# Patient Record
Sex: FEMALE | Race: BLACK OR AFRICAN AMERICAN | Employment: OTHER | URBAN - METROPOLITAN AREA
[De-identification: names, ages, dates, MRNs, and addresses within clinical notes are randomized per-mention and may not be internally consistent; named-entity substitution may affect disease eponyms.]

---

## 2018-04-12 ENCOUNTER — HOSPITAL ENCOUNTER (OUTPATIENT)
Dept: PHYSICAL THERAPY | Age: 68
Discharge: HOME | End: 2018-04-12
Payer: MEDICARE

## 2018-04-12 PROCEDURE — 97530 THERAPEUTIC ACTIVITIES: CPT | Performed by: PHYSICAL THERAPIST

## 2018-04-12 PROCEDURE — G8979 MOBILITY GOAL STATUS: HCPCS | Performed by: PHYSICAL THERAPIST

## 2018-04-12 PROCEDURE — 97162 PT EVAL MOD COMPLEX 30 MIN: CPT | Performed by: PHYSICAL THERAPIST

## 2018-04-12 PROCEDURE — G8978 MOBILITY CURRENT STATUS: HCPCS | Performed by: PHYSICAL THERAPIST

## 2018-04-12 NOTE — PROGRESS NOTES
[facility] [address], [place], [address] [facility]  Phone: [phone]      Fax:  [phone]    Plan of Care/ Statement of Necessity for Physical Therapy Services    Patient name: Abigail Gaviria Start of Care: 2018   Referral source: Sandie Bhat NP : 1950    Medical Diagnosis: Low back pain [M54.5]   Onset Date:2017    Treatment Diagnosis: Low back pain, weakness   Prior Hospitalization: see medical history Provider#: [id number]   Medications: Verified on Patient summary List    Comorbidities: HTN, COPD, sleep apnea, BMI >30, spinal stenosis, asthma,   Prior Level of Function: Independent with no limitiations. The Plan of Care and following information is based on the information from the initial evaluation. Assessment/ key information: Patient is a 75 y/o female who presents to outpatient PT with primary complaint of low back pain with radiating pain down right LE. Patient reports that low back pain and weakness started following right TKR in 2017. Patient received therapy for LBP from 2017-2018 and was improving but exhausted benefits at [facility]. Patient continues to report pain with standing, walking, sitting for prolonged periods and weakness. MD referral of spinal stenosis and evaluation consistent with those findings. Patient demonstrates significant instability in core and LE, decreased ROM and flexibility in bilateral LE's secondary to bilateral TKR in  and , excessive anterior pelvic tilt and lumbar lordosis with hypertonicity in lumbar paraspinals.  Patient reports having progressed from ambulating with walker to cane and now to nothing but continues to have pain and weakness.      Patient will benefit from skilled PT services to modify and progress therapeutic interventions, address functional mobility deficits, address ROM deficits, address strength deficits, analyze and address soft tissue restrictions, analyze and cue movement patterns, analyze and modify body mechanics/ergonomics and assess and modify postural abnormalities to attain remaining goals. Evaluation Complexity History MEDIUM  Complexity : 1-2 comorbidities / personal factors will impact the outcome/ POC ; Examination MEDIUM Complexity : 3 Standardized tests and measures addressing body structure, function, activity limitation and / or participation in recreation  ;Presentation MEDIUM Complexity : Evolving with changing characteristics  ; Clinical Decision Making MEDIUM Complexity : FOTO score of 26-74  Overall Complexity Rating: MEDIUM    Problem List: pain affecting function, decrease ROM, decrease strength, impaired gait/ balance, decrease ADL/ functional abilitiies, decrease activity tolerance, decrease flexibility/ joint mobility and decrease transfer abilities   Treatment Plan may include any combination of the following: Therapeutic exercise, Therapeutic activities, Neuromuscular re-education, Physical agent/modality, Gait/balance training, Manual therapy, Patient education, Functional mobility training and Home safety training  Patient / Family readiness to learn indicated by: asking questions and interest  Persons(s) to be included in education: patient (P)  Barriers to Learning/Limitations: None  Patient Goal (s): To have less pain in my back and get stronger  Patient Self Reported Health Status: good  Rehabilitation Potential: good    Progress towards goals / Updated goals:  Short Term Goals: STG- To be accomplished in 3 week(s):  1. Pt will be independent with HEP to encourage prophylaxis. Eval:Initiate next session. Current: NA     Long Term Goals: LTG- To be accomplished in 6 week(s):  1. Pt will report a >50% reduction in low back pain in order to stand for increased periods with less pain.   Eval:7-10/10  Current: NA     2.  Pt will increase bilateral gross LE strength to >4/5 in order to improve overall stabilization during functional activities. Eval:Gross bilateral LE: 3+/5 (unable to tolerate MMT secondary to poor core stabilization)  Current: NA     3.  Pt will increase AROM of lumbar spine to >75% in all planes of motion with <1/10 pain to improve functional mobility. Eval:  ROM % AROM   Forward flexion 40-60 30%   Extension 20-30 40%   SB right 20-30 50%   SB left 20-30 50%   Rotation right 5-10 50%   Rotation left 5-10 50%      Current: NA     4. Pt FOTO score will increase by >15 points to show improvement in subjective low back function. Eval:40  Current: will address at visit 5    Frequency / Duration: Patient to be seen 2 times per week for 8 weeks. Patient/ Caregiver education and instruction: Diagnosis, prognosis, self care and exercises   [x]  Plan of care has been reviewed with PATRICK    G-Codes (GP)  Mobility   Current  CL= 60-79%   Goal  CK= 40-59%      The severity rating is based on clinical judgment and the FOTO score. Certification Period: 4/12/18 -6/7/18  Carissa Estes, PT 4/12/2018 7:43 PM  _____________________________________________________________________  I certify that the above Therapy Services are being furnished while the patient is under my care. I agree with the treatment plan and certify that this therapy is necessary.     Physician's Signature:____________________  Date:__________Time:______    Please sign and return to   [facility] the [address], [address] [facility]  Phone: [phone]      Fax:  [phone]

## 2018-04-12 NOTE — PROGRESS NOTES
PT DAILY TREATMENT NOTE/LUMBAR EVAL 3-16    Patient Name: Chapman Phoenix  Date:2018  : 1950  [x]  Patient  Verified  Payor: /    In time:3:15  Out time:4:00  Total Treatment Time (min): 45  Total Timed Codes (min): 45  1:1 Treatment Time ( [address] only): 39   Visit #: 1 of 12    Treatment Area: Low back pain [M54.5]  SUBJECTIVE  Pain Level (0-10 scale): 6  []constant []intermittent [x]improving []worsening []no change since onset    Any medication changes, allergies to medications, adverse drug reactions, diagnosis change, or new procedure performed?: [x] No    [] Yes (see summary sheet for update)  Subjective functional status/changes:     PLOF: Prior to knee replacement, patient had no low back pain. Limitations to PLOF: walking, standing, transfers  Mechanism of Injury: Following knee replacment  Current symptoms/Complaints: Patient is a 75 y/o female who presents to outpatient PT with primary complaint of low back pain that is worsening.   -Patient had therapy for low back from 2017-2018 ([facility])  -2017 right TKR and ever since then it increased sciatic pain down in the leg.   -Patient had a walker but progressed to cane.  -Patient reports feeling unstable.  -Pain in back is present in low back and both knees are numb.   -patient with radiating pain down from right lateral thigh down to foot. -Therapy was helping but [facility] refused to take them back.   -Patient has had 8 injections in back and knee.  -Leaving for vacation in   -Patient is going to yoga and getting a .   -Standing increases pain, sitting increases pain and causes stiffness.   -Patient sleeps on right side to reduce the pain.  -Within the last 5 years she has had 5 surgeries. Previous Treatment/Compliance: Patient was treated at [facility] for low back from 2017-2018. Patient had therapy for [facility] at [facility] and [facility].    PMHx/Surgical Hx: See scanned medical record  Work Hx:   Living Situation: Tri-level home and for 9 months confined downstairs. Pt Goals: \"Be able to bend forward at back and get up without so much pain in my back. \"  Barriers: [x]pain []financial []time []transportation []other  Motivation: Good  Substance use: []Alcohol []Tobacco []other:   FABQ Score: []low []elevate  Cognition: A & O x 4    Other:    OBJECTIVE/EXAMINATION      22 min []Eval                  []Re-Eval         23 min Therapeutic Activity:  []  See flow sheet : patient eduction, body mechanics, prognosis, goals   Rationale: increase ROM, increase strength and improve coordination  to improve the patients ability to perform daily activities with decreased pain and symptom levels                 With   [] TE   [x] TA   [] neuro   [] other: Patient Education: [x] Review HEP    [x] Progressed/Changed HEP based on:   [] positioning   [] body mechanics   [] transfers   [] heat/ice application    [] other:      Other Objective/Functional Measures: FOTO: 40    Physical Therapy Evaluation - Lumbar Spine ([facility])         General Health:  Red Flags Indicated? [] Yes    [x] No  [] Yes [] No Recent weight change (If yes, due to dieting? [] Yes  [] No)   [] Yes [] No Weakness in legs during walking  [] Yes [] No Unremitting pain at night  [] Yes [] No Abdominal pain or problems  [] Yes [] No Rectal bleeding  [] Yes [] No Feet more cold or painful in cold weather  [] Yes [] No Menstrual irregularities  [] Yes [] No Blood or pain with urination  [] Yes [] No Dysfunction of bowel or bladder  [] Yes [] No Recent illness within past 3 weeks (i.e, cold, flu)  [] Yes [] No Numbness/tingling in buttock/genitalia region    Past History/Treatments:     Diagnostic Tests: [] Lab work [x] X-rays    [] CT [] MRI     [] Other:  Results: Spinal stenosis lumbar spine.      Functional Status  Prior level of function:  Present functional limitations:  What position do you sleep in?:    OBJECTIVE  Posture:  Lateral Shift: [] R    [] L     [] +  [] -  Kyphosis: [x] Increased [] Decreased   []  WNL  Lordosis:  [x] Increased [] Decreased   [] WNL  Pelvic symmetry: [] WNL    [] Other: Excessive anterior pelvic tilt. Gait:  [] Normal     [] Abnormal:    Active Movements: [] N/A   [] Too acute   [] Other:  ROM % AROM % PROM Comments:pain, area   Forward flexion 40-60 30%     Extension 20-30 40%     SB right 20-30 50%     SB left 20-30 50%     Rotation right 5-10 50%     Rotation left 5-10 50%       -Pain with all functional movements of lumbar spine. Repeated Movements   -repeated lumbar extension- no change in radicular symptoms  -Repeated lumbar flexion: decreased pain in right LE. Neuro Screen [] WNL  Myotome/Dermatome/Reflexes:  Comments:    Dural Mobility:  SLR Sitting: [] R    [] L    [] +    [] -  @ (degrees):           Supine: [x] R    [] L    [x] +    [] -  @ (degrees):   Slump Test: [] R    [] L    [] +    [] -  @ (degrees):   Prone Knee Bend: [] R    [] L    [] +    [] -     Palpation  -Hypertonicity in lumbar parapsinals, TTP noted in L3-L5    Strength   L(0-5) R (0-5) N/T   Hip Flexion (L1,2) 3+ 3+ []   Knee Extension (L3,4) 3+ 3+ []   Ankle Dorsiflexion (L4) 3+ 3+ []   Ankle Plantarflexion (S1) 3+ 3+ []   Knee Flexion (S1,2) 3+ 3+ []   Gluteus Octaviano 3+ 3+ []   Hip Abductors 3+ 3+ []     -Poor core stabilization observed when attempting to provided MMT    Special Tests  Lumbar:  Lumb.  Compression: [] Pos  [] Neg               Lumbar Distraction:   [] Pos  [] Neg    Quadrant:  [] Pos  [] Neg   [] Flex  [] Ext    Sacroilliac:  Gaenslen's: [] R    [] L    [] +    [] -     Compression: [] +    [] -     Gapping:  [] +    [] -     Thigh Thrust: [] R    [] L    [] +    [] -     Leg Length: [] +    [] -   Position:    Crests:    ASIS:    PSIS:    Sacral Sulcus:    Mobility: Standing flex:     Sitting flex:     Supine to sit:     Prone knee bend:         Hip: Beather Slate:  [x] R [x] L    [x] +    [] -     Scour:  [] R    [] L    [] +    [] -     Piriformis: [x] R    [x] L    [x] +    [] -          Deficits: Meliton's: [] R    [] L    [] +    [] -     Sivakumar: [] R    [] L    [] +    [] -     Hamstrings 90/90:    Gastrocsoleus (to neutral): Right: Left:       Global Muscular Weakness:  Abdominals:  Quadratus Lumborum:  Paraspinals: Other:    Other tests/comments:       Pain Level (0-10 scale) post treatment: 6/10    ASSESSMENT/Changes in Function: Patient is a 75 y/o female who presents to outpatient PT with primary complaint of low back pain with radiating pain down right LE. Patient reports that low back pain and weakness started following right TKR in January 2017. Patient received therapy for LBP from Nov 2017-March 2018 and was improving but exhausted benefits at [facility]. Patient continues to report pain with standing, walking, sitting for prolonged periods and weakness. MD referral of spinal stenosis and evaluation consistent with those findings. Patient demonstrates significant instability in core and LE, decreased ROM and flexibility in bilateral LE's secondary to bilateral TKR in 2017 and 2016, excessive anterior pelvic tilt and lumbar lordosis with hypertonicity in lumbar paraspinals. Patient reports having progressed from ambulating with walker to cane and now to nothing but continues to have pain and weakness. Patient will benefit from skilled PT services to modify and progress therapeutic interventions, address functional mobility deficits, address ROM deficits, address strength deficits, analyze and address soft tissue restrictions, analyze and cue movement patterns, analyze and modify body mechanics/ergonomics and assess and modify postural abnormalities to attain remaining goals.      []  See Plan of Care  []  See progress note/recertification  []  See Discharge Summary         Progress towards goals / Updated goals:  Short Term Goals: STG- To be accomplished in 3 week(s):  1. Pt will be independent with HEP to encourage prophylaxis. Eval:Initiate next session. Current: NA    Long Term Goals: LTG- To be accomplished in 6 week(s):  1. Pt will report a >50% reduction in low back pain in order to stand for increased periods with less pain. Eval:7-10/10  Current: NA    2. Pt will increase bilateral gross LE strength to >4/5 in order to improve overall stabilization during functional activities. Eval:Gross bilateral LE: 3+/5 (unable to tolerate MMT secondary to poor core stabilization)  Current: NA    3. Pt will increase AROM of lumbar spine to >75% in all planes of motion with <1/10 pain to improve functional mobility. Eval:  ROM % AROM   Forward flexion 40-60 30%   Extension 20-30 40%   SB right 20-30 50%   SB left 20-30 50%   Rotation right 5-10 50%   Rotation left 5-10 50%     Current: NA    4. Pt FOTO score will increase by >15 points to show improvement in subjective low back function.   Eval:40  Current: will address at visit 5    PLAN  [x]  Upgrade activities as tolerated     [x]  Continue plan of care  []  Update interventions per flow sheet       []  Discharge due to:_  []  Other:_      Cinthia Tolliver, PT 4/12/2018  3:17 PM

## 2018-04-17 ENCOUNTER — HOSPITAL ENCOUNTER (OUTPATIENT)
Dept: PHYSICAL THERAPY | Age: 68
Discharge: HOME | End: 2018-04-17
Payer: MEDICARE

## 2018-04-17 PROCEDURE — 97530 THERAPEUTIC ACTIVITIES: CPT | Performed by: PHYSICAL THERAPIST

## 2018-04-17 PROCEDURE — 97110 THERAPEUTIC EXERCISES: CPT | Performed by: PHYSICAL THERAPIST

## 2018-04-17 NOTE — PROGRESS NOTES
PT DAILY TREATMENT NOTE - [employer]     Patient Name: Rena Fowler  Date:2018  : 1950  [x]  Patient  Verified  Payor: Renny Frankel / Plan: [place] & [place] / Product Type: Medicare /    In time:2:18  Out time:3:30  Total Treatment Time (min): 72  Total Timed Codes (min): 62  1:1 Treatment Time ( [address] only): 30   Visit #: 2 of 12    Treatment Area: Low back pain [M54.5]    SUBJECTIVE  Pain Level (0-10 scale): 7/10  Any medication changes, allergies to medications, adverse drug reactions, diagnosis change, or new procedure performed?: [x] No    [] Yes (see summary sheet for update)  Subjective functional status/changes:   [] No changes reported  \"I am just feeling stiff today. \"    OBJECTIVE    Modality rationale: decrease pain and increase tissue extensibility to improve the patients ability to perform daily activities with decreased pain and symptom levels     Min Type Additional Details    [] Estim:  []Unatt       []IFC  []Premod                        []Other:  []w/ice   []w/heat  Position:  Location:    [] Estim: []Att    []TENS instruct  []NMES                    []Other:  []w/US   []w/ice   []w/heat  Position:  Location:    []  Traction: [] Cervical       []Lumbar                       [] Prone          []Supine                       []Intermittent   []Continuous Lbs:  [] before manual  [] after manual    []  Ultrasound: []Continuous   [] Pulsed                           []1MHz   []3MHz W/cm2:  Location:    []  Iontophoresis with dexamethasone         Location: [] Take home patch   [] In clinic   10 []  Ice     [x]  heat  []  Ice massage  []  Laser   []  Anodyne Position:  Location:    []  Laser with stim  []  Other:  Position:  Location:    []  Vasopneumatic Device Pressure:       [] lo [] med [] hi   Temperature: [] lo [] med [] hi   [] Skin assessment post-treatment:  []intact []redness- no adverse reaction    []redness - adverse reaction:       50 min Therapeutic Exercise:  [x] See flow sheet :   Rationale: increase ROM, increase strength and improve coordination to improve the patients ability to perform daily activities with decreased pain and symptom levels      12 min Therapeutic Activity:  []  See flow sheet :   Rationale: increase ROM, increase strength and improve coordination  to improve the patients ability to perform daily activities with decreased pain and symptom levels           With   [] TE   [] TA   [] neuro   [] other: Patient Education: [x] Review HEP    [] Progressed/Changed HEP based on:   [] positioning   [] body mechanics   [] transfers   [] heat/ice application    [] other:      Other Objective/Functional Measures:   -patient required frequent resting breaks secondary to fatigue     Pain Level (0-10 scale) post treatment: 6/10    ASSESSMENT/Changes in Function: Patient reported pain through exercise program. Educated patient that with previous TKR, patient will have pain for about a year post op. Patient will continue to benefit from skilled PT services to modify and progress therapeutic interventions, address functional mobility deficits, address ROM deficits, address strength deficits, analyze and address soft tissue restrictions, analyze and cue movement patterns, analyze and modify body mechanics/ergonomics and assess and modify postural abnormalities to attain remaining goals. []  See Plan of Care  []  See progress note/recertification  []  See Discharge Summary         Progress towards goals / Updated goals:  Short Term Goals: STG- To be accomplished in 3 week(s):  1.  Pt will be independent with HEP to encourage prophylaxis. Eval:Initiate next session. Current: NA      Long Term Goals: LTG- To be accomplished in 6 week(s):  1.  Pt will report a >50% reduction in low back pain in order to stand for increased periods with less pain.   Eval:7-10/10  Current: NA      2.  Pt will increase bilateral gross LE strength to >4/5 in order to improve overall stabilization during functional activities. Eval:Gross bilateral LE: 3+/5 (unable to tolerate MMT secondary to poor core stabilization)  Current: NA      3.  Pt will increase AROM of lumbar spine to >75% in all planes of motion with <1/10 pain to improve functional mobility.    Eval:  ROM % AROM   Forward flexion 40-60 30%   Extension 20-30 40%   SB right 20-30 50%   SB left 20-30 50%   Rotation right 5-10 50%   Rotation left 5-10 50%       Current: NA      4.  Pt FOTO score will increase by >15 points to show improvement in subjective low back function.   Eval:40  Current: will address at visit 5      PLAN   [x]  Upgrade activities as tolerated     [x]  Continue plan of care  []  Update interventions per flow sheet       []  Discharge due to:_  []  Other:_      Tony Bauer PT 4/17/2018  2:32 PM    Future Appointments  Date Time Provider [place]   4/20/2018 9:30 AM Tony Bauer PT [facility]   4/24/2018 2:00 PM Juno Oquendo [facility]   4/26/2018 12:30 PM Ra Miller PT, DPT [facility]   5/1/2018 2:30 PM Juno Oquendo [facility]   5/3/2018 2:00 PM Jad Guardado PTA [facility]

## 2018-04-20 ENCOUNTER — HOSPITAL ENCOUNTER (OUTPATIENT)
Dept: PHYSICAL THERAPY | Age: 68
Discharge: HOME | End: 2018-04-20
Payer: MEDICARE

## 2018-04-20 PROCEDURE — 97110 THERAPEUTIC EXERCISES: CPT | Performed by: PHYSICAL THERAPIST

## 2018-04-20 PROCEDURE — 97530 THERAPEUTIC ACTIVITIES: CPT | Performed by: PHYSICAL THERAPIST

## 2018-04-20 NOTE — PROGRESS NOTES
PT DAILY TREATMENT NOTE - [facility]     Patient Name: Sukumar Tripp  Date:2018  : 1950  [x]  Patient  Verified  Payor: [place] MED[place]ARE / Plan: [place] MEDICARE PART A & B / Product Type: Medicare /    In time:9:40  Out time:11:00  Total Treatment Time (min): 80  Total Timed Codes (min): 70  1:1 Treatment Time ( [address] only): 30   Visit #: 3 of 12    Treatment Area: Low back pain [M54.5]    SUBJECTIVE  Pain Level (0-10 scale): 7/10  Any medication changes, allergies to medications, adverse drug reactions, diagnosis change, or new procedure performed?: [x] No    [] Yes (see summary sheet for update)  Subjective functional status/changes:   [] No changes reported  \"I felt pretty good after last session; like I got a good workout. \"    OBJECTIVE    Modality rationale: decrease pain and increase tissue extensibility to improve the patients ability to perform daily activities with decreased pain and symptom levels     Min Type Additional Details    [] Estim:  []Unatt       []IFC  []Premod                        []Other:  []w/ice   []w/heat  Position:  Location:    [] Estim: []Att    []TENS instruct  []NMES                    []Other:  []w/US   []w/ice   []w/heat  Position:  Location:    []  Traction: [] Cervical       []Lumbar                       [] Prone          []Supine                       []Intermittent   []Continuous Lbs:  [] before manual  [] after manual    []  Ultrasound: []Continuous   [] Pulsed                           []1MHz   []3MHz W/cm2:  Location:    []  Iontophoresis with dexamethasone         Location: [] Take home patch   [] In clinic   10 []  Ice     [x]  heat  []  Ice massage  []  Laser   []  Anodyne Position:  Location:    []  Laser with stim  []  Other:  Position:  Location:    []  Vasopneumatic Device Pressure:       [] lo [] med [] hi   Temperature: [] lo [] med [] hi   [] Skin assessment post-treatment:  []intact []redness- no adverse reaction    []redness - adverse reaction:       55 min Therapeutic Exercise:  [x] See flow sheet :   Rationale: increase ROM, increase strength and improve coordination to improve the patients ability to perform daily activities with decreased pain and symptom levels      15 min Therapeutic Activity:  [x]  See flow sheet :   Rationale: increase ROM, increase strength and improve coordination  to improve the patients ability to perform daily activities with decreased pain and symptom levels               With   [] TE   [] TA   [] neuro   [] other: Patient Education: [x] Review HEP    [] Progressed/Changed HEP based on:   [] positioning   [] body mechanics   [] transfers   [] heat/ice application    [] other:      Other Objective/Functional Measures:   -easily fatigues during there-ex     Pain Level (0-10 scale) post treatment: 5/10    ASSESSMENT/Changes in Function: Patient had a reduction in pain following today's session. Patient will continue to benefit from skilled PT services to modify and progress therapeutic interventions, address functional mobility deficits, address ROM deficits, address strength deficits, analyze and address soft tissue restrictions, analyze and cue movement patterns and analyze and modify body mechanics/ergonomics to attain remaining goals. []  See Plan of Care  []  See progress note/recertification  []  See Discharge Summary         Progress towards goals / Updated goals:  Short Term Goals: STG- To be accomplished in 3 week(s):  1.  Pt will be independent with HEP to encourage prophylaxis. Eval:Initiate next session. Current: NA      Long Term Goals: LTG- To be accomplished in 6 week(s):  1.  Pt will report a >50% reduction in low back pain in order to stand for increased periods with less pain. Eval:7-10/10  Current: NA      2.  Pt will increase bilateral gross LE strength to >4/5 in order to improve overall stabilization during functional activities.   Eval:Gross bilateral LE: 3+/5 (unable to tolerate MMT secondary to poor core stabilization)  Current: Tolerating AROM exercises      3.  Pt will increase AROM of lumbar spine to >75% in all planes of motion with <1/10 pain to improve functional mobility.    Eval:  ROM % AROM   Forward flexion 40-60 30%   Extension 20-30 40%   SB right 20-30 50%   SB left 20-30 50%   Rotation right 5-10 50%   Rotation left 5-10 50%       Current: NA      4.  Pt FOTO score will increase by >15 points to show improvement in subjective low back function.   Eval:40  Current: will address at visit 5      PLAN  []  Upgrade activities as tolerated     []  Continue plan of care  []  Update interventions per flow sheet       []  Discharge due to:_  []  Other:_      Katharine Casey PT 4/20/2018  10:01 AM    Future Appointments  Date Time Provider Robert Malagon   4/24/2018 3:30 PM Mady Oquendo [facility]   4/26/2018 12:30 PM Naif Summers PT, DPT [facility]   5/1/2018 2:30 PM Mady Oquendo [facility]   5/3/2018 2:00 PM Favian Lamb PTA [facility]

## 2018-04-24 ENCOUNTER — HOSPITAL ENCOUNTER (OUTPATIENT)
Dept: PHYSICAL THERAPY | Age: 68
Discharge: HOME | End: 2018-04-24
Payer: MEDICARE

## 2018-04-24 PROCEDURE — 97110 THERAPEUTIC EXERCISES: CPT

## 2018-04-24 NOTE — PROGRESS NOTES
PT DAILY TREATMENT NOTE - [facility]     Patient Name: Ethan Garcia  Date:2018  : 1950  [x]  Patient  Verified  Payor: Andry Murdock / Plan: [place] MEDICARE PART A & B / Product Type: Medicare /    In time:3:30  Out time:4:30  Total Treatment Time (min): 60  Total Timed Codes (min): 50  1:1 Treatment Time ( [address] only): 30   Visit #: 4 of 12    Treatment Area: Low back pain [M54.5]    SUBJECTIVE  Pain Level (0-10 scale): 7  Any medication changes, allergies to medications, adverse drug reactions, diagnosis change, or new procedure performed?: [x] No    [] Yes (see summary sheet for update)  Subjective functional status/changes:   [] No changes reported  \"the weather is making me hurt more today. I have a MD appointment 18. \"    OBJECTIVE    Modality rationale: decrease inflammation and decrease pain to improve the patients ability to perform daily activities with decreased pain and symptom levels   Min Type Additional Details    [] Estim:  []Unatt       []IFC  []Premod                        []Other:  []w/ice   []w/heat  Position:  Location:    [] Estim: []Att    []TENS instruct  []NMES                    []Other:  []w/US   []w/ice   []w/heat  Position:  Location:    []  Traction: [] Cervical       []Lumbar                       [] Prone          []Supine                       []Intermittent   []Continuous Lbs:  [] before manual  [] after manual    []  Ultrasound: []Continuous   [] Pulsed                           []1MHz   []3MHz W/cm2:  Location:    []  Iontophoresis with dexamethasone         Location: [] Take home patch   [] In clinic   10 []  Ice     [x]  heat  []  Ice massage  []  Laser   []  Anodyne Position: supine  Location:low back     []  Laser with stim  []  Other:  Position:  Location:    []  Vasopneumatic Device Pressure:       [] lo [] med [] hi   Temperature: [] lo [] med [] hi   [x] Skin assessment post-treatment:  [x]intact []redness- no adverse reaction    []redness - adverse reaction: 50 min Therapeutic Exercise:  [x] See flow sheet :   Rationale: increase ROM, increase strength and increase proprioception to improve the patients ability to perform daily activities with decreased pain and symptom levels    With   [] TE   [] TA   [] neuro   [] other: Patient Education: [x] Review HEP    [] Progressed/Changed HEP based on:   [] positioning   [] body mechanics   [] transfers   [] heat/ice application    [] other:      Other Objective/Functional Measures:   Increased time needed with exercises due to fatigue     Pain Level (0-10 scale) post treatment: 5    ASSESSMENT/Changes in Function: Pt continues to need extra time with exercises however able to tolerate. Decreased core strength noted with PPT with use of upper body needing cues to correct. Patient will continue to benefit from skilled PT services to modify and progress therapeutic interventions, address functional mobility deficits, address ROM deficits, address strength deficits, analyze and modify body mechanics/ergonomics, assess and modify postural abnormalities and instruct in home and community integration to attain remaining goals. []  See Plan of Care  []  See progress note/recertification  []  See Discharge Summary         Progress towards goals / Updated goals:  Short Term Goals: STG- To be accomplished in 3 week(s):  1.  Pt will be independent with HEP to encourage prophylaxis. Eval:Initiate next session. Current: compliance per pt report       Long Term Goals: LTG- To be accomplished in 6 week(s):  1.  Pt will report a >50% reduction in low back pain in order to stand for increased periods with less pain. Eval:7-10/10  Current: no change       2.  Pt will increase bilateral gross LE strength to >4/5 in order to improve overall stabilization during functional activities. Eval:Gross bilateral LE: 3+/5 (unable to tolerate MMT secondary to poor core stabilization)  Current:  Tolerating AROM exercises      3.  Pt will increase AROM of lumbar spine to >75% in all planes of motion with <1/10 pain to improve functional mobility.    Eval:  ROM % AROM   Forward flexion 40-60 30%   Extension 20-30 40%   SB right 20-30 50%   SB left 20-30 50%   Rotation right 5-10 50%   Rotation left 5-10 50%       Current: NA      4.  Pt FOTO score will increase by >15 points to show improvement in subjective low back function.   Eval:40  Current: will address at visit 5      PLAN  [x]  Upgrade activities as tolerated     [x]  Continue plan of care  []  Update interventions per flow sheet       []  Discharge due to:_  []  Other:_      Janneth Oquendo 4/24/2018  3:34 PM    Future Appointments  Date Time Provider Robert Malagon   4/26/2018 12:30 PM Nelly Torres, PT, DPT [facility]   4/30/2018 5:00 PM Veda Gramajo PTA [facility]   5/1/2018 2:30 PM Janneth LYONS [facility]

## 2018-04-26 ENCOUNTER — HOSPITAL ENCOUNTER (OUTPATIENT)
Dept: PHYSICAL THERAPY | Age: 68
Discharge: HOME | End: 2018-04-26
Payer: MEDICARE

## 2018-04-26 PROCEDURE — G8978 MOBILITY CURRENT STATUS: HCPCS

## 2018-04-26 PROCEDURE — 97110 THERAPEUTIC EXERCISES: CPT

## 2018-04-26 PROCEDURE — G8979 MOBILITY GOAL STATUS: HCPCS

## 2018-04-26 NOTE — PROGRESS NOTES
[facility] at the [address], [place], [address] [facility]  Phone: [phone]      Fax:  [phone]        Medicare Progress Report    Patient name: Moon José Start of Care: 2018   Referral source: Sunshine Bynum NP : 1950                         Medical Diagnosis: Low back pain [M54.5] Onset Date:2017                         Treatment Diagnosis: Low back pain, weakness   Prior Hospitalization: see medical history Provider#: [id number]   Medications: Verified on Patient summary List    Comorbidities: HTN, COPD, sleep apnea, BMI >30, spinal stenosis, asthma,   Prior Level of Function: Independent with no limitiations.         Visits from Start of Care: 5    Missed Visits: 0    Progress Toward Goals:   Short Term Goals: STG- To be accomplished in 3 week(s):  1.  Pt will be independent with HEP to encourage prophylaxis. Eval:Initiate next session. Current: compliance per pt report       Long Term Goals: LTG- To be accomplished in 6 week(s):  1.  Pt will report a >50% reduction in low back pain in order to stand for increased periods with less pain. Eval:7-10/10  Current: progressing gradually  8/10 at worst       2.  Pt will increase bilateral gross LE strength to >4/5 in order to improve overall stabilization during functional activities.   Eval:Gross bilateral LE: 3+/5 (unable to tolerate MMT secondary to poor core stabilization)  Current: Progressing, glrossly decreased strength especially gluts, very challenged with ext and abd  unable to tolerate MMT position, but per tolerance to exercises is gradually improving       3.  Pt will increase AROM of lumbar spine to >75% in all planes of motion with <1/10 pain to improve functional mobility.    Eval:  ROM % AROM   Forward flexion 40-60 30%   Extension 20-30 40%   SB right 20-30 50%   SB left 20-30 50%   Rotation right 5-10 50%   Rotation left 5-10 50%       Current: progressing Rot limited 25-50%      4.  Pt FOTO score will increase by >15 points to show improvement in subjective low back function. Eval:40  Current: mild improvement 41    Key functional changes: Reports continued pain into right LE from buttock into right lateral this and down to foot. Reports pain into left LE and buttock as well. Pt expresses that does not want to walk wobbly and wants to be able to bend and pick things up off the floor and bend knees. Problems/ barriers to goal attainment: none     Assessment / Recommendations:  Patient is a 75 y/o female who presents to outpatient PT with primary complaint of low back pain with radiating pain down right LE. Patient reports that low back pain and weakness started following right TKR in January 2017. Patient received therapy for LBP from Nov 2017-March 2018 and was improving but exhausted benefits at [facility]. Patient continues to report pain with standing, walking, sitting for prolonged periods and weakness. MD referral of spinal stenosis and evaluation consistent with those findings. Patient demonstrates significant instability in core and LE, decreased ROM and flexibility in bilateral LE's secondary to bilateral TKR in 2017 and 2016, excessive anterior pelvic tilt and lumbar lordosis with hypertonicity in lumbar paraspinals. Pt has been seen at this location for 5 visits including evaluation. Reports seeing improvement in 5 visits with pain, and mobility. Would benefit from continued skilled PT to address strength, ROM, core stabilization and mobility.      Problem List: pain affecting function, decrease ROM, decrease strength, impaired gait/ balance, decrease ADL/ functional abilitiies, decrease activity tolerance and decrease flexibility/ joint mobility   Treatment Plan: Therapeutic exercise, Therapeutic activities, Neuromuscular re-education, Physical agent/modality, Gait/balance training, Manual therapy and Patient education    Patient Goal (s) has been updated and includes: \"Not to walk so wobbly and to bend better. \"     Updated Goals to be accomplished in 4 weeks:  Same as above    Frequency / Duration: Patient to be seen 2 times per week for 4 weeks:    G-Codes (GP)  Mobility  [id number] Current  CL= 60-79%   Goal  CK= 40-59%      The severity rating is based on clinical judgement and the FOTO score.       Rene Walker, PT, DPT 4/26/2018 2:25 PM

## 2018-04-26 NOTE — PROGRESS NOTES
PT DAILY TREATMENT NOTE - [facility]     Patient Name: Roderick Natarajan  Date:2018  : 1950  [x]  Patient  Verified  Payor: [place] MEDICARE / Plan: [place] MEDICARE PART A & B / Product Type: Medicare /    In time:12:30 pm  Out time:2:15 pm   Total Treatment Time (min): 105  Total Timed Codes (min): 90  1:1 Treatment Time ( [address] only): 30   Visit #: 5 of 12    Treatment Area: Low back pain [M54.5]    SUBJECTIVE  Pain Level (0-10 scale): 5  Any medication changes, allergies to medications, adverse drug reactions, diagnosis change, or new procedure performed?: [x] No    [] Yes (see summary sheet for update)  Subjective functional status/changes:   [] No changes reported  \"I am doing ok. I see the doctor on Friday. They moved me up. \"    OBJECTIVE    Modality rationale: decrease pain and increase tissue extensibility to improve the patients ability to perform daily activities with decreased pain and symptom levels     Min Type Additional Details    [] Estim:  []Unatt       []IFC  []Premod                        []Other:  []w/ice   []w/heat  Position:  Location:    [] Estim: []Att    []TENS instruct  []NMES                    []Other:  []w/US   []w/ice   []w/heat  Position:  Location:    []  Traction: [] Cervical       []Lumbar                       [] Prone          []Supine                       []Intermittent   []Continuous Lbs:  [] before manual  [] after manual    []  Ultrasound: []Continuous   [] Pulsed                           []1MHz   []3MHz W/cm2:  Location:    []  Iontophoresis with dexamethasone         Location: [] Take home patch   [] In clinic   15 []  Ice     [x]  heat  []  Ice massage  []  Laser   []  Anodyne Position: supine with LE elevated  Location:L-spine    []  Laser with stim  []  Other:  Position:  Location:    []  Vasopneumatic Device Pressure:       [] lo [] med [] hi   Temperature: [] lo [] med [] hi   [x] Skin assessment post-treatment:  [x]intact []redness- no adverse reaction    []redness - adverse reaction:     90 min Therapeutic Exercise:  [x] See flow sheet :   Rationale: increase ROM, increase strength, improve coordination and increase proprioception to improve the patients ability to perform daily activities with decreased pain and symptom levels          With   [] TE   [] TA   [] neuro   [] other: Patient Education: [x] Review HEP    [] Progressed/Changed HEP based on:   [] positioning   [] body mechanics   [] transfers   [] heat/ice application    [] other:      Other Objective/Functional Measures:   FOTO 41  See goals for objective measure  significant glut weakness     Pain Level (0-10 scale) post treatment: 3-4    ASSESSMENT/Changes in Function:   Reports continued pain into right LE from buttock into right lateral this and down to foot. Reports pain into left LE and buttock as well. Pt expresses that does not want to walk wobbly and wants to be able to bend and pick things up off the floor and bend knees. Patient is a 75 y/o female who presents to outpatient PT with primary complaint of low back pain with radiating pain down right LE. Patient reports that low back pain and weakness started following right TKR in January 2017. Patient received therapy for LBP from Nov 2017-March 2018 and was improving but exhausted benefits at [address] Physical therapy. Patient continues to report pain with standing, walking, sitting for prolonged periods and weakness. MD referral of spinal stenosis and evaluation consistent with those findings. Patient demonstrates significant instability in core and LE, decreased ROM and flexibility in bilateral LE's secondary to bilateral TKR in 2017 and 2016, excessive anterior pelvic tilt and lumbar lordosis with hypertonicity in lumbar paraspinals. Pt has been seen at this location for 5 visits including evaluation. Reports seeing improvement in 5 visits with pain, and mobility.  Would benefit from continued skilled PT to address strength, ROM, core stabilization and mobility. Patient will continue to benefit from skilled PT services to modify and progress therapeutic interventions, address functional mobility deficits, address ROM deficits, address strength deficits, analyze and address soft tissue restrictions, analyze and cue movement patterns, analyze and modify body mechanics/ergonomics, assess and modify postural abnormalities and instruct in home and community integration to attain remaining goals. []  See Plan of Care  []  See progress note/recertification  []  See Discharge Summary         Progress towards goals / Updated goals:  Short Term Goals: STG- To be accomplished in 3 week(s):  1.  Pt will be independent with HEP to encourage prophylaxis. Eval:Initiate next session. Current: compliance per pt report       Long Term Goals: LTG- To be accomplished in 6 week(s):  1.  Pt will report a >50% reduction in low back pain in order to stand for increased periods with less pain. Eval:7-10/10  Current: progressing gradually  8/10 at worst       2.  Pt will increase bilateral gross LE strength to >4/5 in order to improve overall stabilization during functional activities. Eval:Gross bilateral LE: 3+/5 (unable to tolerate MMT secondary to poor core stabilization)  Current: Progressing, glrossly decreased strength especially gluts, very challenged with ext and abd  unable to tolerate MMT position, but per tolerance to exercises is gradually improving       3.  Pt will increase AROM of lumbar spine to >75% in all planes of motion with <1/10 pain to improve functional mobility.    Eval:  ROM % AROM   Forward flexion 40-60 30%   Extension 20-30 40%   SB right 20-30 50%   SB left 20-30 50%   Rotation right 5-10 50%   Rotation left 5-10 50%       Current: progressing Rot limited 25-50%      4.  Pt FOTO score will increase by >15 points to show improvement in subjective low back function.   Eval:40  Current: mild improvement 41      PLAN  [x]  Upgrade activities as tolerated     []  Continue plan of care  []  Update interventions per flow sheet       []  Discharge due to:_  []  Other:_      Conner Hong, PT, DPT 4/26/2018  12:42 PM    Future Appointments  Date Time Provider [place] Malagon   4/30/2018 5:00 PM Moise GoveaCHINO [facility]   5/1/2018 2:30 PM Esvin BARNESCHINO [facility]

## 2018-04-30 ENCOUNTER — APPOINTMENT (OUTPATIENT)
Dept: PHYSICAL THERAPY | Age: 68
End: 2018-04-30
Payer: MEDICARE

## 2018-05-01 ENCOUNTER — HOSPITAL ENCOUNTER (OUTPATIENT)
Dept: PHYSICAL THERAPY | Age: 68
Discharge: HOME | End: 2018-05-01
Payer: MEDICARE

## 2018-05-01 PROCEDURE — 97110 THERAPEUTIC EXERCISES: CPT

## 2018-05-01 NOTE — PROGRESS NOTES
PT DAILY TREATMENT NOTE - [facility]     Patient Name: Chey Swanson  Date:2018  : 1950  [x]  Patient  Verified  Payor: [place] MEDICARE / Plan: [place] MEDICARE PART A & B / Product Type: Medicare /    In time:2:30  Out time:3:26  Total Treatment Time (min): 56  Total Timed Codes (min): 56  1:1 Treatment Time ( [address] only): 35   Visit #: 6 of 12    Treatment Area: Low back pain [M54.5]    SUBJECTIVE  Pain Level (0-10 scale): 5  Any medication changes, allergies to medications, adverse drug reactions, diagnosis change, or new procedure performed?: [x] No    [] Yes (see summary sheet for update)  Subjective functional status/changes:   [] No changes reported  \"I feel like I getting better. I just surgery on my big toe from an ingrown toe nail. \"    OBJECTIVE      56 min Therapeutic Exercise:  [x] See flow sheet :   Rationale: increase ROM and increase strength to improve the patients ability to perform daily activities with decreased pain and symptom levels          With   [] TE   [] TA   [] neuro   [] other: Patient Education: [x] Review HEP    [] Progressed/Changed HEP based on:   [] positioning   [] body mechanics   [] transfers   [] heat/ice application    [] other:      Other Objective/Functional Measures:   Improved form with PPT  Increased LE fatigue today     Pain Level (0-10 scale) post treatment: 3-4    ASSESSMENT/Changes in Function: Pt with increased LE fatigue today suggesting decreased low back use. Very challenged with 3 way hip and step ups still needing frequent breaks. Session cut a little short today due to massage immediately following therapy.      Patient will continue to benefit from skilled PT services to modify and progress therapeutic interventions, address functional mobility deficits, address ROM deficits, address strength deficits, analyze and modify body mechanics/ergonomics, assess and modify postural abnormalities and instruct in home and community integration to attain remaining goals.     []  See Plan of Care  []  See progress note/recertification  []  See Discharge Summary         Progress towards goals / Updated goals:    Long Term Goals: LTG- To be accomplished in 6 week(s):  1.  Pt will report a >50% reduction in low back pain in order to stand for increased periods with less pain. Eval:7-10/10  Last PN: progressing gradually  8/10 at worst  Current: NT       2.  Pt will increase bilateral gross LE strength to >4/5 in order to improve overall stabilization during functional activities. Eval:Gross bilateral LE: 3+/5 (unable to tolerate MMT secondary to poor core stabilization)  Last PN: Progressing, glrossly decreased strength especially gluts, very challenged with ext and abd  unable to tolerate MMT position, but per tolerance to exercises is gradually improving   Current: very challenged with 3 way hip and step ups still needing frequent rest breaks - progressing       3.  Pt will increase AROM of lumbar spine to >75% in all planes of motion with <1/10 pain to improve functional mobility.    Eval:  ROM % AROM   Forward flexion 40-60 30%   Extension 20-30 40%   SB right 20-30 50%   SB left 20-30 50%   Rotation right 5-10 50%   Rotation left 5-10 50%       Current: progressing Rot limited 25-50%      4.  Pt FOTO score will increase by >15 points to show improvement in subjective low back function.   Eval:40  Current: mild improvement 41      PLAN  [x]  Upgrade activities as tolerated     [x]  Continue plan of care  []  Update interventions per flow sheet       []  Discharge due to:_  []  Other:_      [place] 5/1/2018  2:35 PM    Future Appointments  Date Time Provider Robert Malagon   5/2/2018 5:30 PM [place] MIHPTBW THE [facility]

## 2018-05-02 ENCOUNTER — HOSPITAL ENCOUNTER (OUTPATIENT)
Dept: PHYSICAL THERAPY | Age: 68
Discharge: HOME | End: 2018-05-02
Payer: MEDICARE

## 2018-05-02 PROCEDURE — 97110 THERAPEUTIC EXERCISES: CPT

## 2018-05-02 NOTE — PROGRESS NOTES
PT DAILY TREATMENT NOTE - [facility]     Patient Name: Sukumar Tripp  Date:2018  : 1950  [x]  Patient  Verified  Payor: [place] MEDICARE / Plan: [place] MEDICARE PART A & B / Product Type: Medicare /    In time:5:30  Out time:6:30  Total Treatment Time (min): 60  Total Timed Codes (min): 50  1:1 Treatment Time ( [address] only): 35   Visit #: 7 of 12    Treatment Area: Low back pain [M54.5]    SUBJECTIVE  Pain Level (0-10 scale): 5  Any medication changes, allergies to medications, adverse drug reactions, diagnosis change, or new procedure performed?: [x] No    [] Yes (see summary sheet for update)  Subjective functional status/changes:   [] No changes reported  \"My back felt a lot better after the massage yesterday. \"    OBJECTIVE    Modality rationale: decrease inflammation and decrease pain to improve the patients ability to perform daily activities with decreased pain and symptom levels   Min Type Additional Details    [] Estim:  []Unatt       []IFC  []Premod                        []Other:  []w/ice   []w/heat  Position:  Location:    [] Estim: []Att    []TENS instruct  []NMES                    []Other:  []w/US   []w/ice   []w/heat  Position:  Location:    []  Traction: [] Cervical       []Lumbar                       [] Prone          []Supine                       []Intermittent   []Continuous Lbs:  [] before manual  [] after manual    []  Ultrasound: []Continuous   [] Pulsed                           []1MHz   []3MHz W/cm2:  Location:    []  Iontophoresis with dexamethasone         Location: [] Take home patch   [] In clinic   10 [x]  Ice     []  heat  []  Ice massage  []  Laser   []  Anodyne Position:supine  Location: low back    []  Laser with stim  []  Other:  Position:  Location:    []  Vasopneumatic Device Pressure:       [] lo [] med [] hi   Temperature: [] lo [] med [] hi   [x] Skin assessment post-treatment:  [x]intact []redness- no adverse reaction    []redness - adverse reaction:         50 min Therapeutic Exercise:  [x] See flow sheet :   Rationale: increase ROM, increase strength and improve coordination to improve the patients ability to perform daily activities with decreased pain and symptom levels           With   [] TE   [] TA   [] neuro   [] other: Patient Education: [x] Review HEP    [] Progressed/Changed HEP based on:   [] positioning   [] body mechanics   [] transfers   [] heat/ice application    [] other:      Other Objective/Functional Measures:   Decreased rest breaks today with step ups       Pain Level (0-10 scale) post treatment: 2    ASSESSMENT/Changes in Function: Pt with decreased pain post session today with improved tolerance to exercises as well. Challenged with wall ball squats. Patient will continue to benefit from skilled PT services to modify and progress therapeutic interventions, address functional mobility deficits, address ROM deficits, address strength deficits, analyze and modify body mechanics/ergonomics, assess and modify postural abnormalities and instruct in home and community integration to attain remaining goals. []  See Plan of Care  []  See progress note/recertification  []  See Discharge Summary         Progress towards goals / Updated goals:  Long Term Goals: LTG- To be accomplished in 6 week(s):  1.  Pt will report a >50% reduction in low back pain in order to stand for increased periods with less pain. Eval:7-10/10  Last PN: progressing gradually  8/10 at worst  Current: 2/10 pain at end of session today - progressing       2.  Pt will increase bilateral gross LE strength to >4/5 in order to improve overall stabilization during functional activities.   Eval:Gross bilateral LE: 3+/5 (unable to tolerate MMT secondary to poor core stabilization)  Last PN: Progressing, glrossly decreased strength especially gluts, very challenged with ext and abd  unable to tolerate MMT position, but per tolerance to exercises is gradually improving   Current: very challenged with 3 way hip and step ups still needing frequent rest breaks - progressing       3.  Pt will increase AROM of lumbar spine to >75% in all planes of motion with <1/10 pain to improve functional mobility.    Eval:  ROM % AROM   Forward flexion 40-60 30%   Extension 20-30 40%   SB right 20-30 50%   SB left 20-30 50%   Rotation right 5-10 50%   Rotation left 5-10 50%       Last PN: progressing Rot limited 25-50%  Current: NT      4.  Pt FOTO score will increase by >15 points to show improvement in subjective low back function.   Eval:40  Last PN: mild improvement 41  Current: NT      PLAN  [x]  Upgrade activities as tolerated     [x]  Continue plan of care  []  Update interventions per flow sheet       []  Discharge due to:_  []  Other:_      Tayler Shaw 5/2/2018  5:26 PM    Future Appointments  Date Time Provider Robert Malagon   5/2/2018 5:30 PM [place] Shaw [facility]PT[facility] THE [facility]   5/8/2018 1:30 PM Bemargaret Alex Remesic [facility]PT[facility] THE [facility]   5/10/2018 2:30 PM Jannell Dancer, PTA [facility] THE [facility]   5/15/2018 2:00 PM Madelaine Harman PT [facility] THE [facility]   5/17/2018 1:00 PM Madelaine Harman PT [facility]   5/22/2018 1:30 PM Beola Alex Remesic [facility] THE [facility]   5/24/2018 2:00 PM Jannell Dancer, PTA [facility] THE [facility]

## 2018-05-03 ENCOUNTER — APPOINTMENT (OUTPATIENT)
Dept: PHYSICAL THERAPY | Age: 68
End: 2018-05-03
Payer: MEDICARE

## 2018-05-08 ENCOUNTER — HOSPITAL ENCOUNTER (OUTPATIENT)
Dept: PHYSICAL THERAPY | Age: 68
Discharge: HOME | End: 2018-05-08
Payer: MEDICARE

## 2018-05-08 PROCEDURE — 97110 THERAPEUTIC EXERCISES: CPT

## 2018-05-08 PROCEDURE — G8979 MOBILITY GOAL STATUS: HCPCS

## 2018-05-08 PROCEDURE — G8978 MOBILITY CURRENT STATUS: HCPCS

## 2018-05-08 NOTE — PROGRESS NOTES
PT DAILY TREATMENT NOTE - [facility]     Patient Name: Erica Mejía  Date:2018  : 1950  [x]  Patient  Verified  Payor: [place] MEDICARE / Plan: [place] MEDICARE PART A & B / Product Type: Medicare /    In time:1:30  Out time:2:43  Total Treatment Time (min): 73  Total Timed Codes (min): 63  1:1 Treatment Time ( [address] only): 35   Visit #: 8 of 12    Treatment Area: Low back pain [M54.5]    SUBJECTIVE  Pain Level (0-10 scale): 7  Any medication changes, allergies to medications, adverse drug reactions, diagnosis change, or new procedure performed?: [x] No    [] Yes (see summary sheet for update)  Subjective functional status/changes:   [] No changes reported  \"My back is getting better just stiff. \"    OBJECTIVE    Modality rationale: decrease pain and increase tissue extensibility to improve the patients ability to perform daily activities with decreased pain and symptom levels   Min Type Additional Details    [] Estim:  []Unatt       []IFC  []Premod                        []Other:  []w/ice   []w/heat  Position:  Location:    [] Estim: []Att    []TENS instruct  []NMES                    []Other:  []w/US   []w/ice   []w/heat  Position:  Location:    []  Traction: [] Cervical       []Lumbar                       [] Prone          []Supine                       []Intermittent   []Continuous Lbs:  [] before manual  [] after manual    []  Ultrasound: []Continuous   [] Pulsed                           []1MHz   []3MHz W/cm2:  Location:    []  Iontophoresis with dexamethasone         Location: [] Take home patch   [] In clinic   10 []  Ice     [x]  heat  []  Ice massage  []  Laser   []  Anodyne Position: supine  Location: low back     []  Laser with stim  []  Other:  Position:  Location:    []  Vasopneumatic Device Pressure:       [] lo [] med [] hi   Temperature: [] lo [] med [] hi   [x] Skin assessment post-treatment:  [x]intact []redness- no adverse reaction    []redness - adverse reaction:       63 min Therapeutic Exercise:  [x] See flow sheet :   Rationale: increase ROM, increase strength, improve coordination and increase proprioception to improve the patients ability to perform daily activities with decreased pain and symptom levels          With   [] TE   [] TA   [] neuro   [] other: Patient Education: [x] Review HEP    [] Progressed/Changed HEP based on:   [] positioning   [] body mechanics   [] transfers   [] heat/ice application    [] other:      Other Objective/Functional Measures:   See updated goals     Pain Level (0-10 scale) post treatment: 5    ASSESSMENT/Changes in Function: Pt presented to therapy with c/c low back pain radiating down right LE that started after TKR in January 2017. Patient received therapy for LBP from Nov 2017-March 2018 and was improving but exhausted benefits at [facility] Physical therapy. Pt reports overall back pain is decreasing with 60% improvement since starting therapy. Reports continued stiffness first thing in the morning however exercises help. ROM and exercise tolerance is improving as well with most difficulty with prolonged standing and sitting. Pt would benefit from continued skilled PT services for 3 weeks the DC to complete HEP at home to manage symptoms. Patient will continue to benefit from skilled PT services to modify and progress therapeutic interventions, address functional mobility deficits, address ROM deficits, address strength deficits, analyze and modify body mechanics/ergonomics, assess and modify postural abnormalities and instruct in home and community integration to attain remaining goals. []  See Plan of Care  []  See progress note/recertification  []  See Discharge Summary         Progress towards goals / Updated goals:  Long Term Goals: LTG- To be accomplished in 6 week(s):  1.  Pt will report a >50% reduction in low back pain in order to stand for increased periods with less pain.   Eval:7-10/10  Last PN: progressing gradually  8/10 at worst  Current: 60% reduction in symptoms - goal Met      2.  Pt will increase bilateral gross LE strength to >4/5 in order to improve overall stabilization during functional activities. Eval:Gross bilateral LE: 3+/5 (unable to tolerate MMT secondary to poor core stabilization)  Last PN: Progressing, glrossly decreased strength especially gluts, very challenged with ext and abd  unable to tolerate MMT position, but per tolerance to exercises is gradually improving   Current: 3+/5 all planes MMT however able to tolerate all exercises - progressing       3.  Pt will increase AROM of lumbar spine to >75% in all planes of motion with <1/10 pain to improve functional mobility.    Eval:  ROM % AROM   Forward flexion 40-60 30%   Extension 20-30 40%   SB right 20-30 50%   SB left 20-30 50%   Rotation right 5-10 50%   Rotation left 5-10 50%       Last PN: progressing Rot limited 25-50%    Current: progressing   ROM % AROM   Forward flexion 40-60 Mid shin 75%   Extension 20-30 50%   SB right 20-30 100%   SB left 20-30 100%   Rotation right 5-10 50%   Rotation left 5-10 50%         4.  Pt FOTO score will increase by >15 points to show improvement in subjective low back function. Eval:40  Last PN: mild improvement 41  Current:  Will reassess NV         PLAN  [x]  Upgrade activities as tolerated     [x]  Continue plan of care  []  Update interventions per flow sheet       []  Discharge due to:_  []  Other:_      Tayler Shaw 5/8/2018  1:37 PM    Future Appointments  Date Time Provider Robert Malagon   5/10/2018 2:30 PM Jannell Dancer, PTA [facility] THE [facility]   5/15/2018 2:00 PM MIKEY JavierHPCHINO THE [facility]   5/17/2018 1:00 PM MIKEY Javier THE [facility]   5/22/2018 1:30 PM Delta LYONS THE [facility]   5/24/2018 2:00 PM Jannell Dancer, PTA [facility] THE [facility]

## 2018-05-08 NOTE — PROGRESS NOTES
[facility] at the [address], [place], [address] [facility]  Phone: [phone]      Fax:  [phone]        Medicare Progress Report    Patient name: New Russo Start of Care: 18   Referral source: Merly Smith NP : 1950   Medical/Treatment Diagnosis: Low back pain [M54.5] Onset Date:2017     Prior Hospitalization: see medical history Provider#: [id number]   Medications: Verified on Patient Summary List    Comorbidities: HTN, COPD, sleep apnea, BMI >30, spinal stenosis, asthma,  Prior Level of Function: independent with no limitations  Visits from Start of Care: 8    Missed Visits: 1    Reporting Period: 18 to     Subjective Reports: \"My back is getting better just stiff. \"    [address]- To be accomplished in 6 week(s):  1.  Pt will report a >50% reduction in low back pain in order to stand for increased periods with less pain. Eval:7-10/10  Last PN: progressing gradually  8/10 at worst  Current: 60% reduction in symptoms - goal Met      2.  Pt will increase bilateral gross LE strength to >4/5 in order to improve overall stabilization during functional activities.   Eval:Gross bilateral LE: 3+/5 (unable to tolerate MMT secondary to poor core stabilization)  Last PN: Progressing, glrossly decreased strength especially gluts, very challenged with ext and abd  unable to tolerate MMT position, but per tolerance to exercises is gradually improving   Current: 3+/5 all planes MMT however able to tolerate all exercises - progressing       3.  Pt will increase AROM of lumbar spine to >75% in all planes of motion with <1/10 pain to improve functional mobility.    Eval:  ROM % AROM   Forward flexion 40-60 30%   Extension 20-30 40%   SB right 20-30 50%   SB left 20-30 50%   Rotation right 5-10 50%   Rotation left 5-10 50%       Last PN: progressing Rot limited 25-50%     Current: progressing   ROM % AROM   Forward flexion 40-60 Mid shin 75%   Extension 20-30 50%   SB right 20-30 100%   SB left 20-30 100%   Rotation right 5-10 50%   Rotation left 5-10 50%          4.  Pt FOTO score will increase by >15 points to show improvement in subjective low back function. Eval:40  Last PN: mild improvement 41  Current: Will reassess NV           Key functional changes: improved endurance, exercise tolerance, lumbar ROM      Problems/ barriers to goal attainment: pain, decreased overall strength     Assessment / Recommendations: Pt presented to therapy with c/c low back pain radiating down right LE that started after TKR in January 2017. Patient received therapy for LBP from Nov 2017-March 2018 and was improving but exhausted benefits at [facility]. Pt reports overall back pain is decreasing with 60% improvement since starting therapy. Reports continued stiffness first thing in the morning however exercises help. ROM and exercise tolerance is improving as well with most difficulty with prolonged standing and sitting. Pt would benefit from continued skilled PT services for 3 weeks the DC to complete HEP at home to manage symptoms.        Problem List: pain affecting function, decrease ROM, decrease strength, impaired gait/ balance, decrease ADL/ functional abilitiies, decrease activity tolerance, decrease flexibility/ joint mobility and decrease transfer abilities   Treatment Plan: Therapeutic exercise, Therapeutic activities, Neuromuscular re-education, Physical agent/modality, Patient education, Self Care training and Functional mobility training    Patient Goal (s) has been updated and includes: \"Get rid of the stiffness. \"     Updated Goals to be accomplished in 3 weeks:  See unmet above    Frequency / Duration: Patient to be seen 2 times per week for 3 weeks:    G-Codes (GP)  Mobility  [id number] Current  CL= 60-79%   Goal  CK= 40-59%      The severity rating is based on clinical judgement and the FOTO score.       Celine 5/8/2018 1:57 PM

## 2018-05-10 ENCOUNTER — HOSPITAL ENCOUNTER (OUTPATIENT)
Dept: PHYSICAL THERAPY | Age: 68
End: 2018-05-10
Payer: MEDICARE

## 2018-05-15 ENCOUNTER — HOSPITAL ENCOUNTER (OUTPATIENT)
Dept: PHYSICAL THERAPY | Age: 68
Discharge: HOME | End: 2018-05-15
Payer: MEDICARE

## 2018-05-15 PROCEDURE — 97110 THERAPEUTIC EXERCISES: CPT | Performed by: PHYSICAL THERAPIST

## 2018-05-15 PROCEDURE — 97530 THERAPEUTIC ACTIVITIES: CPT | Performed by: PHYSICAL THERAPIST

## 2018-05-15 NOTE — PROGRESS NOTES
PT DAILY TREATMENT NOTE - [facility]     Patient Name: Leila Dee  Date:5/15/2018  : 1950  [x]  Patient  Verified  Payor: [place] MED[place]ARE / Plan: [place] MEDICARE PART A & B / Product Type: Medicare /    In time:2:10  Out time:3:13  Total Treatment Time (min): 63  Total Timed Codes (min): 53  1:1 Treatment Time ( [address] only): 40   Visit #: 9 of 14    Treatment Area: Low back pain [M54.5]    SUBJECTIVE  Pain Level (0-10 scale): 5/10  Any medication changes, allergies to medications, adverse drug reactions, diagnosis change, or new procedure performed?: [x] No    [] Yes (see summary sheet for update)  Subjective functional status/changes:   [] No changes reported  \"I am doing fine today. \"    OBJECTIVE    Modality rationale: decrease pain and increase tissue extensibility to improve the patients ability to perform daily activities with decreased pain and symptom levels     Min Type Additional Details    [] Estim:  []Unatt       []IFC  []Premod                        []Other:  []w/ice   []w/heat  Position:  Location:    [] Estim: []Att    []TENS instruct  []NMES                    []Other:  []w/US   []w/ice   []w/heat  Position:  Location:    []  Traction: [] Cervical       []Lumbar                       [] Prone          []Supine                       []Intermittent   []Continuous Lbs:  [] before manual  [] after manual    []  Ultrasound: []Continuous   [] Pulsed                           []1MHz   []3MHz W/cm2:  Location:    []  Iontophoresis with dexamethasone         Location: [] Take home patch   [] In clinic   10 []  Ice     [x]  heat  []  Ice massage  []  Laser   []  Anodyne Position:  Location:    []  Laser with stim  []  Other:  Position:  Location:    []  Vasopneumatic Device Pressure:       [] lo [] med [] hi   Temperature: [] lo [] med [] hi   [] Skin assessment post-treatment:  []intact []redness- no adverse reaction    []redness - adverse reaction:       38 min Therapeutic Exercise:  [x] See flow sheet : Rationale: increase ROM, increase strength and improve coordination to improve the patients ability to perform daily activities with decreased pain and symptom levels      15 min Therapeutic Activity:  [x]  See flow sheet :   Rationale: increase ROM, increase strength and improve coordination  to improve the patients ability to perform daily activities with decreased pain and symptom levels                 With   [] TE   [] TA   [] neuro   [] other: Patient Education: [x] Review HEP    [] Progressed/Changed HEP based on:   [] positioning   [] body mechanics   [] transfers   [] heat/ice application    [] other:      Other Objective/Functional Measures:   -patient demonstrate fatigue and instability in bilateral LE's during wall ball squats. Pain Level (0-10 scale) post treatment: 5/10    ASSESSMENT/Changes in Function: Unable to clear hips from table during bridges due to decreased hip strength and stability. Patient will continue to benefit from skilled PT services to modify and progress therapeutic interventions, address functional mobility deficits, address ROM deficits, address strength deficits, analyze and address soft tissue restrictions, analyze and cue movement patterns, analyze and modify body mechanics/ergonomics and assess and modify postural abnormalities to attain remaining goals. []  See Plan of Care  []  See progress note/recertification  []  See Discharge Summary         Long Term Goals: LTG- To be accomplished in 6 week(s):  1.  Pt will report a >50% reduction in low back pain in order to stand for increased periods with less pain. Eval:7-10/10  Last PN: progressing gradually  8/10 at worst  Current: 60% reduction in symptoms - goal Met      2.  Pt will increase bilateral gross LE strength to >4/5 in order to improve overall stabilization during functional activities.   Eval:Gross bilateral LE: 3+/5 (unable to tolerate MMT secondary to poor core stabilization)  Last PN: Progressing, glrossly decreased strength especially gluts, very challenged with ext and abd  unable to tolerate MMT position, but per tolerance to exercises is gradually improving   Current: 3+/5 all planes MMT however able to tolerate all exercises - progressing       3.  Pt will increase AROM of lumbar spine to >75% in all planes of motion with <1/10 pain to improve functional mobility.    Eval:  ROM % AROM   Forward flexion 40-60 30%   Extension 20-30 40%   SB right 20-30 50%   SB left 20-30 50%   Rotation right 5-10 50%   Rotation left 5-10 50%       Last PN: progressing Rot limited 25-50%      Current: progressing   ROM % AROM   Forward flexion 40-60 Mid shin 75%   Extension 20-30 50%   SB right 20-30 100%   SB left 20-30 100%   Rotation right 5-10 50%   Rotation left 5-10 50%           4.  Pt FOTO score will increase by >15 points to show improvement in subjective low back function. Eval:40  Last PN: mild improvement 41  Current:  Will reassess NV      PLAN  [x]  Upgrade activities as tolerated     [x]  Continue plan of care  []  Update interventions per flow sheet       []  Discharge due to:_  []  Other:_      Camilla Kay, PT 5/15/2018  2:20 PM    Future Appointments  Date Time Provider Robert Malagon   5/17/2018 1:00 PM MIKEY ArzateCHINO [facility]   5/22/2018 1:30 PM Neal LYONS [facility]   5/24/2018 2:00 PM PATRICK ZimmerHPCHINO [facility]

## 2018-05-17 ENCOUNTER — HOSPITAL ENCOUNTER (OUTPATIENT)
Dept: PHYSICAL THERAPY | Age: 68
Discharge: HOME | End: 2018-05-17
Payer: MEDICARE

## 2018-05-17 PROCEDURE — 97530 THERAPEUTIC ACTIVITIES: CPT | Performed by: PHYSICAL THERAPIST

## 2018-05-17 PROCEDURE — 97110 THERAPEUTIC EXERCISES: CPT | Performed by: PHYSICAL THERAPIST

## 2018-05-17 NOTE — PROGRESS NOTES
PT DAILY TREATMENT NOTE - [facility]     Patient Name: Honey Villarreal  Date:2018  : 1950  [x]  Patient  Verified  Payor: [place] MEDICARE / Plan: [place] MEDICARE PART A & B / Product Type: Medicare /    In time:12:55  Out time:2:05  Total Treatment Time (min): 71  Total Timed Codes (min): 61  1:1 Treatment Time ( [address] only):  30  Visit #: 10 of 14    Treatment Area: Low back pain [M54.5]    SUBJECTIVE  Pain Level (0-10 scale): 7/10  Any medication changes, allergies to medications, adverse drug reactions, diagnosis change, or new procedure performed?: [x] No    [] Yes (see summary sheet for update)  Subjective functional status/changes:   [] No changes reported  \"I am hurting a little more today. \"    OBJECTIVE    Modality rationale: decrease pain and increase tissue extensibility to improve the patients ability to perform daily activities with decreased pain and symptom levels     Min Type Additional Details    [] Estim:  []Unatt       []IFC  []Premod                        []Other:  []w/ice   []w/heat  Position:  Location:    [] Estim: []Att    []TENS instruct  []NMES                    []Other:  []w/US   []w/ice   []w/heat  Position:  Location:    []  Traction: [] Cervical       []Lumbar                       [] Prone          []Supine                       []Intermittent   []Continuous Lbs:  [] before manual  [] after manual    []  Ultrasound: []Continuous   [] Pulsed                           []1MHz   []3MHz W/cm2:  Location:    []  Iontophoresis with dexamethasone         Location: [] Take home patch   [] In clinic   10 []  Ice     [x]  heat  []  Ice massage  []  Laser   []  Anodyne Position:  Location:    []  Laser with stim  []  Other:  Position:  Location:    []  Vasopneumatic Device Pressure:       [] lo [] med [] hi   Temperature: [] lo [] med [] hi   [] Skin assessment post-treatment:  []intact []redness- no adverse reaction    []redness - adverse reaction:         40 min Therapeutic Exercise:  [x] See flow sheet :   Rationale: increase ROM, increase strength and improve coordination to improve the patients ability to perform daily activities with decreased pain and symptom levels      21 min Therapeutic Activity:  [x]  See flow sheet :   Rationale: increase ROM, increase strength and improve coordination  to improve the patients ability to perform daily activities with decreased pain and symptom levels             With   [] TE   [] TA   [] neuro   [] other: Patient Education: [x] Review HEP    [] Progressed/Changed HEP based on:   [] positioning   [] body mechanics   [] transfers   [] heat/ice application    [] other:      Other Objective/Functional Measures:   Modified exercises due to increased pain this session   FOTO: 44    Pain Level (0-10 scale) post treatment: 5/10    ASSESSMENT/Changes in Function: Patient reported pain reduction following today's session. Recommend progressing exercises. Patient will continue to benefit from skilled PT services to modify and progress therapeutic interventions, address functional mobility deficits, address ROM deficits, address strength deficits, analyze and address soft tissue restrictions, analyze and cue movement patterns, analyze and modify body mechanics/ergonomics and assess and modify postural abnormalities to attain remaining goals. []  See Plan of Care  []  See progress note/recertification  []  See Discharge Summary         Progress towards goals / Updated goals:  Long Term Goals: LTG- To be accomplished in 6 week(s):  1.  Pt will report a >50% reduction in low back pain in order to stand for increased periods with less pain. Eval:7-10/10  Last PN: progressing gradually  8/10 at worst  Current: 60% reduction in symptoms - goal Met      2.  Pt will increase bilateral gross LE strength to >4/5 in order to improve overall stabilization during functional activities.   Eval:Gross bilateral LE: 3+/5 (unable to tolerate MMT secondary to poor core stabilization)  Last PN: Progressing, glrossly decreased strength especially gluts, very challenged with ext and abd  unable to tolerate MMT position, but per tolerance to exercises is gradually improving   Current: 3+/5 all planes MMT however able to tolerate all exercises - progressing       3.  Pt will increase AROM of lumbar spine to >75% in all planes of motion with <1/10 pain to improve functional mobility.    Eval:  ROM % AROM   Forward flexion 40-60 30%   Extension 20-30 40%   SB right 20-30 50%   SB left 20-30 50%   Rotation right 5-10 50%   Rotation left 5-10 50%       Last PN: progressing Rot limited 25-50%      Current: progressing   ROM % AROM   Forward flexion 40-60 Mid shin 75%   Extension 20-30 50%   SB right 20-30 100%   SB left 20-30 100%   Rotation right 5-10 50%   Rotation left 5-10 50%           4.  Pt FOTO score will increase by >15 points to show improvement in subjective low back function.   Eval:40  Last PN: mild improvement 41  Current: 44-progressing         PLAN  [x]  Upgrade activities as tolerated     [x]  Continue plan of care  []  Update interventions per flow sheet       []  Discharge due to:_  []  Other:_      Cinthia Tolliver, PT 5/17/2018  2:34 PM    Future Appointments  Date Time Provider Robert Malagon   5/22/2018 1:30 PM Lebron Oquendo hospitalsTBW [facility]   5/24/2018 2:00 PM Antonio Snyder, PATRICK MIHPTBW [facility]

## 2018-05-22 ENCOUNTER — HOSPITAL ENCOUNTER (OUTPATIENT)
Dept: PHYSICAL THERAPY | Age: 68
End: 2018-05-22
Payer: MEDICARE

## 2018-05-24 ENCOUNTER — HOSPITAL ENCOUNTER (OUTPATIENT)
Dept: PHYSICAL THERAPY | Age: 68
Discharge: HOME | End: 2018-05-24
Payer: MEDICARE

## 2018-05-24 PROCEDURE — G8979 MOBILITY GOAL STATUS: HCPCS

## 2018-05-24 PROCEDURE — G8980 MOBILITY D/C STATUS: HCPCS

## 2018-05-24 PROCEDURE — 97530 THERAPEUTIC ACTIVITIES: CPT

## 2018-05-24 PROCEDURE — 97110 THERAPEUTIC EXERCISES: CPT

## 2018-05-24 NOTE — PROGRESS NOTES
[facility] at the [address], [place], [address] [facility]  Phone: [phone]      Fax:  [phone]    Discharge Summary    Patient name: Ana Lew     Start of Care: 18  Referral source: Ness Salomon NP    : 1950  Medical/Treatment Diagnosis: Low back pain [M54.5]  Onset Date:2017  Prior Hospitalization: see medical history   Provider#: [id number]  Comorbidities: HTN, COPD, sleep apnea, BMI >30, spinal stenosis, asthma,  Prior Level of Function:independent with no limitations  Medications: Verified on Patient Summary List    Visits from Start of Care: 11    Missed Visits: 3  Reporting Period : 18 to 18    Long Term Goals: LTG- To be accomplished in 6 week(s):  1.  Pt will report a >50% reduction in low back pain in order to stand for increased periods with less pain. Eval:7-10/10  Last PN: progressing gradually  8/10 at worst  Current: 60% reduction in symptoms - goal Met      2.  Pt will increase bilateral gross LE strength to >4/5 in order to improve overall stabilization during functional activities.   Eval:Gross bilateral LE: 3+/5 (unable to tolerate MMT secondary to poor core stabilization)  Last PN: Progressing, glrossly decreased strength especially gluts, very challenged with ext and abd  unable to tolerate MMT position, but per tolerance to exercises is gradually improving   Current: 3+/5 all planes MMT however able to tolerate all exercises - progressing       3.  Pt will increase AROM of lumbar spine to >75% in all planes of motion with <1/10 pain to improve functional mobility.    Eval:  ROM % AROM   Forward flexion 40-60 30%   Extension 20-30 40%   SB right 20-30 50%   SB left 20-30 50%   Rotation right 5-10 50%   Rotation left 5-10 50%       Last PN: progressing Rot limited 25-50%      Current: progressing   ROM % AROM   Forward flexion 40-60 Mid shin 75%   Extension 20-30 50%   SB right 20-30 100%   SB left 20-30 100%   Rotation right 5-10 50%   Rotation left 5-10 50%           4.  Pt FOTO score will increase by >15 points to show improvement in subjective low back function. Eval:40  Last PN: mild improvement 41  Current: 44-progressing        Assessment/ Summary of Care: Pt presented to therapy with c/c low back pain after TKR in January 2017. Pt has attended 11 sessions with reporting 60% reduction in symptoms since starting therapy. Pt is motivated with yoga and senior exercises and plans to be out of town until mid July. Pt is ready to be discharged at this time due to progress towards goals and improved overall mobility with ability to return to exercise classes. Updated HEP with discharge instructions given today. RECOMMENDATIONS:  [x]Discontinue therapy: [x]Patient has reached or is progressing toward set goals      []Patient is non-compliant or has abdicated      []Due to lack of appreciable progress towards set goals    Ad CORONEL Remesic 5/24/2018 3:09 PM    NOTE TO PHYSICIAN:  Please complete the following and fax to: [facility] at [facility] at [phone]  Retain this original for your records. If you are unable to process this request in   24 hours, please contact our office.      [] I have read the above report and request that my patient continue therapy with the following changes/special instructions:  [] I have read the above report and request that my patient be discharged from therapy    Physician's Signature:_____________________ Date:___________Time:__________

## 2018-05-24 NOTE — PROGRESS NOTES
PT DAILY TREATMENT NOTE - [facility]     Patient Name: Mike Narayanan  Date:2018  : 1950  [x]  Patient  Verified  Payor: [place] MEDICARE / Plan: [place] MEDICARE PART A & B / Product Type: Medicare /    In time:1407  Out time:1500  Total Treatment Time (min): 63  Total Timed Codes (min): 63  1:1 Treatment Time ( [address] only): 54   Visit #: 11 of 14    Treatment Area: Low back pain [M54.5]    SUBJECTIVE  Pain Level (0-10 scale): 5/10  Any medication changes, allergies to medications, adverse drug reactions, diagnosis change, or new procedure performed?: [x] No    [] Yes (see summary sheet for update)  Subjective functional status/changes:   [] No changes reported  I am going on vacation and will be gone until mid July.     OBJECTIVE    Modality rationale:    Min Type Additional Details    [] Estim:  []Unatt       []IFC  []Premod                        []Other:  []w/ice   []w/heat  Position:  Location:    [] Estim: []Att    []TENS instruct  []NMES                    []Other:  []w/US   []w/ice   []w/heat  Position:  Location:    []  Traction: [] Cervical       []Lumbar                       [] Prone          []Supine                       []Intermittent   []Continuous Lbs:  [] before manual  [] after manual    []  Ultrasound: []Continuous   [] Pulsed                           []1MHz   []3MHz W/cm2:  Location:    []  Iontophoresis with dexamethasone         Location: [] Take home patch   [] In clinic    []  Ice     []  heat  []  Ice massage  []  Laser   []  Anodyne Position:  Location:    []  Laser with stim  []  Other:  Position:  Location:    []  Vasopneumatic Device Pressure:       [] lo [] med [] hi   Temperature: [] lo [] med [] hi   [] Skin assessment post-treatment:  []intact []redness- no adverse reaction    []redness  adverse reaction:      min []Eval                  []Re-Eval       43 min Therapeutic Exercise:  [x] See flow sheet :Reviewed HEP for discharge   Rationale: increase ROM, increase strength and improve coordination to improve the patients ability to perform ADL's with decreased pain and sx    20 min Therapeutic Activity:  []  See flow sheet :   Rationale: increase ROM, increase strength and improve coordination  to improve the patients ability to perform ADL's with decreased pain and sx               With   [] TE   [] TA   [] neuro   [] other: Patient Education: [x] Review HEP    [] Progressed/Changed HEP based on:   [] positioning   [] body mechanics   [] transfers   [] heat/ice application    [] other:      Other Objective/Functional Measures: see discharge     Pain Level (0-10 scale) post treatment: 2-3/10    ASSESSMENT/Changes in Function: Pt is motivated with yoga and senior exercise classes, with plans to be OOT until mid July so HEP was issued and promoted today for discharge. []  See Plan of Care  []  See progress note/recertification  [x]  See Discharge Summary         Progress towards goals / Updated goals:  Long Term Goals: LTG- To be accomplished in 6 week(s):  1.  Pt will report a >50% reduction in low back pain in order to stand for increased periods with less pain. Eval:7-10/10  Last PN: progressing gradually  8/10 at worst  Current: 60% reduction in symptoms - goal Met      2.  Pt will increase bilateral gross LE strength to >4/5 in order to improve overall stabilization during functional activities.   Eval:Gross bilateral LE: 3+/5 (unable to tolerate MMT secondary to poor core stabilization)  Last PN: Progressing, glrossly decreased strength especially gluts, very challenged with ext and abd  unable to tolerate MMT position, but per tolerance to exercises is gradually improving   Current: 3+/5 all planes MMT however able to tolerate all exercises - progressing       3.  Pt will increase AROM of lumbar spine to >75% in all planes of motion with <1/10 pain to improve functional mobility.    Eval:  ROM % AROM   Forward flexion 40-60 30%   Extension 20-30 40%   SB right 20-30 50%   SB left 20-30 50%   Rotation right 5-10 50%   Rotation left 5-10 50%       Last PN: progressing Rot limited 25-50%      Current: progressing   ROM % AROM   Forward flexion 40-60 Mid shin 75%   Extension 20-30 50%   SB right 20-30 100%   SB left 20-30 100%   Rotation right 5-10 50%   Rotation left 5-10 50%           4.  Pt FOTO score will increase by >15 points to show improvement in subjective low back function. Eval:40  Last PN: mild improvement 41  Current: 44-progressing          PLAN  []  Upgrade activities as tolerated     []  Continue plan of care  []  Update interventions per flow sheet       [x]  Discharge due to:pt to be OOT through mid July. Pt issued HEP to promote self monitored exercise routine._  []  Other:_      Jad Guardado, PATRICK 5/24/2018  2:26 PM    No future appointments.